# Patient Record
Sex: FEMALE | Race: WHITE | NOT HISPANIC OR LATINO | Employment: PART TIME | URBAN - METROPOLITAN AREA
[De-identification: names, ages, dates, MRNs, and addresses within clinical notes are randomized per-mention and may not be internally consistent; named-entity substitution may affect disease eponyms.]

---

## 2019-04-23 PROBLEM — I10 ESSENTIAL HYPERTENSION, BENIGN: Status: ACTIVE | Noted: 2019-04-23

## 2019-04-30 PROBLEM — E55.9 VITAMIN D DEFICIENCY DISEASE: Status: ACTIVE | Noted: 2019-04-30

## 2019-04-30 PROBLEM — E53.8 CYANOCOBALAMIN DEFICIENCY: Status: ACTIVE | Noted: 2019-04-30

## 2019-05-08 PROBLEM — D50.9 IRON DEFICIENCY ANEMIA: Status: ACTIVE | Noted: 2019-05-08

## 2020-06-14 PROBLEM — E78.49 OTHER HYPERLIPIDEMIA: Status: ACTIVE | Noted: 2020-06-14

## 2020-08-28 PROBLEM — G89.29 CHRONIC NECK PAIN: Status: ACTIVE | Noted: 2020-08-28

## 2020-08-28 PROBLEM — R63.5 ABNORMAL WEIGHT GAIN: Status: ACTIVE | Noted: 2020-08-28

## 2020-08-28 PROBLEM — M54.12 CERVICAL RADICULOPATHY: Status: ACTIVE | Noted: 2020-08-28

## 2020-08-28 PROBLEM — M54.2 CHRONIC NECK PAIN: Status: ACTIVE | Noted: 2020-08-28

## 2020-09-15 PROBLEM — M50.321 OTHER CERVICAL DISC DEGENERATION AT C4-C5 LEVEL: Status: ACTIVE | Noted: 2020-09-15

## 2020-10-16 ENCOUNTER — HOSPITAL ENCOUNTER (OUTPATIENT)
Dept: RADIOLOGY | Facility: HOSPITAL | Age: 40
Discharge: HOME | End: 2020-10-16
Attending: INTERNAL MEDICINE
Payer: COMMERCIAL

## 2020-10-16 DIAGNOSIS — M54.12 CERVICAL RADICULOPATHY: ICD-10-CM

## 2020-10-16 DIAGNOSIS — G89.29 CHRONIC NECK PAIN: ICD-10-CM

## 2020-10-16 DIAGNOSIS — M50.321 OTHER CERVICAL DISC DEGENERATION AT C4-C5 LEVEL: ICD-10-CM

## 2020-10-16 DIAGNOSIS — M54.2 CHRONIC NECK PAIN: ICD-10-CM

## 2020-10-16 PROCEDURE — 72141 MRI NECK SPINE W/O DYE: CPT | Mod: 26,,, | Performed by: RADIOLOGY

## 2020-10-16 PROCEDURE — 72141 MRI NECK SPINE W/O DYE: CPT | Mod: TC

## 2020-10-16 PROCEDURE — 72141 MRI CERVICAL SPINE WITHOUT CONTRAST: ICD-10-PCS | Mod: 26,,, | Performed by: RADIOLOGY

## 2021-03-08 ENCOUNTER — PATIENT MESSAGE (OUTPATIENT)
Dept: ADMINISTRATIVE | Facility: OTHER | Age: 41
End: 2021-03-08

## 2022-02-10 ENCOUNTER — PATIENT MESSAGE (OUTPATIENT)
Dept: PEDIATRICS | Facility: CLINIC | Age: 42
End: 2022-02-10
Payer: COMMERCIAL

## 2022-03-07 ENCOUNTER — PATIENT MESSAGE (OUTPATIENT)
Dept: PEDIATRICS | Facility: CLINIC | Age: 42
End: 2022-03-07
Payer: COMMERCIAL

## 2022-12-12 PROBLEM — E88.810 DYSMETABOLIC SYNDROME X: Status: ACTIVE | Noted: 2022-12-12

## 2023-01-31 ENCOUNTER — NUTRITION (OUTPATIENT)
Dept: INTERNAL MEDICINE | Facility: CLINIC | Age: 43
End: 2023-01-31

## 2023-01-31 DIAGNOSIS — E88.810 DYSMETABOLIC SYNDROME X: ICD-10-CM

## 2023-01-31 DIAGNOSIS — Z71.3 DIETARY COUNSELING: Primary | ICD-10-CM

## 2023-01-31 PROCEDURE — 97802 PR MED NUTR THER, 1ST, INDIV, EA 15 MIN: ICD-10-PCS | Mod: S$GLB,,, | Performed by: DIETITIAN, REGISTERED

## 2023-01-31 PROCEDURE — 97802 MEDICAL NUTRITION INDIV IN: CPT | Mod: S$GLB,,, | Performed by: DIETITIAN, REGISTERED

## 2023-01-31 NOTE — PROGRESS NOTES
"Nutrition Assessment  Session Time:  60 minutes      Client name:  Leola Park  :  1980  Age:  43 y.o.  Gender:  female    Client states:  referred by Edyta Hankins MD with a diagnosis of:   -Dysmetabolic syndrome X    Present today to discuss diet improvement for weight loss.     Weight history:  2017: 125 lbs   2018: fell and broke tailbone, weight increased [id number] lbs  2020: neck injury, weight increased to current amount    Currently using recumbent bike 3-4x/week, but questions if that is adequate cardio.     Began Metformin a little over 1 week ago.     Current intake:  Breakfast: eggs + vegetables// 1 wheat toast with avocado// Chick haseeb a cardenas egg and cheese muffin + 1 coke regular (quit for 4-5 years then started back// does not have daily at the moment)  Lunch: 3 vegetables + chicken// [place]// dining out  Snack: not daily// peanut butter, sargento balanced breaks  Dinner: leftovers from lunch// chick haseeb a spicy chicken sandwich + mac and cheese  Drinks: 1 coke (not daily), water, Gatorade zero (not daily), orange juice (not daily), unsweetened almond milk when having cereal or if eating pb with banana, Alcohol (1 bottle wine on weekends or Old Fashion)  Fruit: variety    Previous results with: whole 30, Beachbody, Optivia        Anthropometrics  Height:  65"     Weight:  173.36 lbs   2022: 172 lbs  BMI:  28.9  % Body Fat:  n/a    Clinical Signs/Symptoms  N/V/D:  none noted  Appetite:  good       Past Medical History:   Diagnosis Date    Allergy     Asthma     Essential (primary) hypertension     Fractured coccyx     Hypercholesterolemia        Past Surgical History:   Procedure Laterality Date    REPAIR OF ANAL FISTULA      TONSILLECTOMY         Medications    has a current medication list which includes the following prescription(s): atorvastatin, atorvastatin, azelastine, cholecalciferol (vitamin d3), cyclobenzaprine, trulicity, ferrous sulfate, fluticasone propionate, " hydrocodone-acetaminophen, metformin, norethindrone, olmesartan-hydrochlorothiazide, mounjaro, and valacyclovir.    Vitamins, Minerals, and/or Supplements:  not discussed     Food/Medication Interactions:  Reviewed     Food Allergies or Intolerances:  dairy (cheese ok// causes sinus infection)     Social History    Marital status:    Employment:  not discussed    Social History     Tobacco Use    Smoking status: Never    Smokeless tobacco: Never   Substance Use Topics    Alcohol use: Yes     Alcohol/week: 4.0 standard drinks     Types: 4 Glasses of wine per week        Lab Reports   Sodium   Date Value Ref Range Status   06/29/2022 137 134 - 144 mmol/L Final     Potassium   Date Value Ref Range Status   06/29/2022 4.3 3.5 - 5.2 mmol/L Final     Chloride   Date Value Ref Range Status   06/29/2022 100 96 - 106 mmol/L Final     CO2   Date Value Ref Range Status   06/29/2022 25 20 - 29 mmol/L Final     Glucose   Date Value Ref Range Status   06/29/2022 87 65 - 99 mg/dL Final     BUN   Date Value Ref Range Status   06/29/2022 14 6 - 24 mg/dL Final     Creatinine   Date Value Ref Range Status   06/29/2022 0.86 0.57 - 1.00 mg/dL Final     Calcium   Date Value Ref Range Status   06/29/2022 9.1 8.7 - 10.2 mg/dL Final     Total Protein   Date Value Ref Range Status   06/04/2020 7.1 6.0 - 8.5 g/dL Final     Albumin   Date Value Ref Range Status   06/29/2022 4.6 3.8 - 4.8 g/dL Final   06/04/2020 4.9 (H) 3.8 - 4.8 g/dL Final     Total Bilirubin   Date Value Ref Range Status   06/29/2022 0.4 0.0 - 1.2 mg/dL Final     Alkaline Phosphatase   Date Value Ref Range Status   06/04/2020 49 39 - 117 IU/L Final     AST   Date Value Ref Range Status   06/29/2022 22 0 - 40 IU/L Final     ALT   Date Value Ref Range Status   06/29/2022 19 0 - 32 IU/L Final     eGFR if non    Date Value Ref Range Status   10/25/2021 81 >59 mL/min/1.73 Final      Lab Results   Component Value Date    WBC 6.3 06/29/2022    HGB 14.1  06/29/2022    HCT 42.1 06/29/2022    MCV 94 06/29/2022     06/29/2022        Lab Results   Component Value Date    CHOL 193 06/29/2022     Lab Results   Component Value Date    HDL 57 06/29/2022     Lab Results   Component Value Date    LDLCALC 106 (H) 06/29/2022     Lab Results   Component Value Date    TRIG 171 (H) 06/29/2022     No results found for: CHOLHDL  Lab Results   Component Value Date    HGBA1C 5.3 06/29/2022     BP Readings from Last 1 Encounters:   12/12/22 138/82       Food History  Listed above    Exercise History:  listed above    Cultural/Spiritual/Personal Preferences:  None identified    Support System:   family/friends    State of Change:  Preparation    Barriers to Change:  none    Diagnosis    Overweight related to excess energy intake as evidenced by BMI 28.    Intervention    RMR (Method:  Heard St. Jeor):  1446 kcal  Activity Factor:  1.3    LEMUEL:  1879 - 500 = 1379 kcal    Goals:  1.  Decrease intake of saturated fats  2.  Increase movement in daily activities  3.  Plate method at meals    Nutrition Education  The following education was provided to the patient:  Discussed meal planning/[employer] My Plate design.  Discussed healthy snacking.   Discussed weight management.  Discussed Heart Healthy Eating.  Suggested dietary modifications based on current dietary behaviors and individual food preferences.  Discussed importance of small behavior/habit changes in improving long-term adherence and sustainability.    Patient verbalized understanding of nutrition education and recommendations received.    Handouts Provided  Balanced Plate  Snack List  Eat Fit Shopping List  Meal Planning Guide      Monitoring/Evaluation    Monitor the following:  Weight  BMI  Caloric intake  Labs:      Follow Up Plan:  as needed

## 2023-11-10 ENCOUNTER — IMMUNIZATION (OUTPATIENT)
Dept: PEDIATRICS | Facility: CLINIC | Age: 43
End: 2023-11-10
Payer: COMMERCIAL

## 2023-11-10 PROCEDURE — 90686 FLU VACCINE (QUAD) GREATER THAN OR EQUAL TO 3YO PRESERVATIVE FREE IM: ICD-10-PCS | Mod: S$GLB,,, | Performed by: PEDIATRICS

## 2023-11-10 PROCEDURE — 90471 IMMUNIZATION ADMIN: CPT | Mod: S$GLB,,, | Performed by: PEDIATRICS

## 2023-11-10 PROCEDURE — 90686 IIV4 VACC NO PRSV 0.5 ML IM: CPT | Mod: S$GLB,,, | Performed by: PEDIATRICS

## 2023-11-10 PROCEDURE — 90471 FLU VACCINE (QUAD) GREATER THAN OR EQUAL TO 3YO PRESERVATIVE FREE IM: ICD-10-PCS | Mod: S$GLB,,, | Performed by: PEDIATRICS
